# Patient Record
Sex: FEMALE | Race: BLACK OR AFRICAN AMERICAN | NOT HISPANIC OR LATINO | Employment: UNEMPLOYED | RURAL
[De-identification: names, ages, dates, MRNs, and addresses within clinical notes are randomized per-mention and may not be internally consistent; named-entity substitution may affect disease eponyms.]

---

## 2019-09-25 ENCOUNTER — TRANSCRIBE ORDERS (OUTPATIENT)
Dept: PHYSICAL THERAPY | Facility: CLINIC | Age: 44
End: 2019-09-25

## 2019-09-25 DIAGNOSIS — M54.5 LOW BACK PAIN, UNSPECIFIED BACK PAIN LATERALITY, UNSPECIFIED CHRONICITY, WITH SCIATICA PRESENCE UNSPECIFIED: Primary | ICD-10-CM

## 2019-10-23 ENCOUNTER — TREATMENT (OUTPATIENT)
Dept: PHYSICAL THERAPY | Facility: CLINIC | Age: 44
End: 2019-10-23

## 2019-10-23 DIAGNOSIS — M54.41 ACUTE RIGHT-SIDED LOW BACK PAIN WITH RIGHT-SIDED SCIATICA: Primary | ICD-10-CM

## 2019-10-23 PROCEDURE — 97161 PT EVAL LOW COMPLEX 20 MIN: CPT | Performed by: PHYSICAL THERAPIST

## 2019-10-23 NOTE — PROGRESS NOTES
"  Physical Therapy Initial Evaluation and Plan of Care        Patient: Titi Denney   : 1975  Diagnosis/ICD-10 Code:  Acute right-sided low back pain with right-sided sciatica [M54.41]  Referring practitioner: Horacio Cohen MD  Date of Initial Visit: 10/23/2019  Today's Date: 10/23/2019  Patient seen for 1 sessions  PRECAUTIONS  E-stim Implant in right Gluteus         Subjective Questionnaire: Oswestry: 46%      Subjective Evaluation    History of Present Illness  Onset date: 5+ years.  Mechanism of injury: No Known Injury    Subjective comment: Worsening condition.  Started pain management. Does not want to be on pain meds  Patient Occupation: Work at the [employer] Quality of life: good    Pain  Current pain ratin  At best pain ratin  At worst pain rating: 10  Location: Low back pain with pain to the ankle on the right side.  Quality: throbbing, sharp and knife-like  Relieving factors: change in position and rest  Aggravating factors: standing, ambulation and lifting  Progression: worsening    Social Support  Lives in: apartment (upstairs )  Lives with: young children    Hand dominance: right    Diagnostic Tests  X-ray: abnormal    Treatments  Previous treatment: chiropractic  Patient Goals  Patient goals for therapy: decreased pain, increased motion, increased strength and independence with ADLs/IADLs         Objective       Active Range of Motion     Lumbar   Flexion: 60 degrees   Extension: 5 degrees   Left lateral flexion: WFL  Right lateral flexion: Active right lumbar lateral flexion: 6\" from LJL.   TTP cadence PSIS, tenderness around e-stim implant in Right gluteus   Right LLD, inominate rotation. Tight hamstrings.  Negative Piter, Negative SLR  Sensation intact  MMT 5/5 in lower extremities.    Assessment & Plan     Assessment  Impairments: abnormal or restricted ROM, activity intolerance, lacks appropriate home exercise program and pain with function  Assessment details: " Chronic LBP with flexibility and core strength issues. Mechanical SI dysfunction present.   Would benefit from PT for appropriate exercise program and manual therapy.  Prognosis: fair  Functional Limitations: lifting, walking, standing and stooping  Goals  Plan Goals:    1.  Lumbar flexion 12 inches from the floor.  2.  Lumbar extension 10  degrees.  3.  Lumbar side bending 4 inches from the right lateral joint line.  4.  Patient to maintain pelvic alignment with no LLD.  5.  Patient to be minimally reactive to palpation.  6.  Patient independent in HEP and self care.  7.   Pt will have a modified Oswestry score of 38% or less    Plan  Therapy options: will be seen for skilled physical therapy services  Planned therapy interventions: home exercise program, stretching, strengthening, manual therapy, functional ROM exercises and flexibility  Other planned therapy interventions: aquatic  Duration in visits: 20  Treatment plan discussed with: patient  Plan details: Flexibility, core stabilization, manual therapy, Aquatics 1x week, land 1 x week.      Visit Diagnoses:    ICD-10-CM ICD-9-CM   1. Acute right-sided low back pain with right-sided sciatica M54.41 724.2     724.3     Timed:  Manual Therapy:         mins  75437;  Therapeutic Exercise:         mins  02724;     Neuromuscular Ky:        mins  84393;    Therapeutic Activity:          mins  86911;     Gait Training:           mins  23864;     Ultrasound:          mins  24440;    Electrical Stimulation:         mins  76473 ( );    Untimed:  Electrical Stimulation:         mins  51266 ( );  Mechanical Traction:         mins  13465;     Timed Treatment:  0    mins   Total Treatment:      0  mins    PT SIGNATURE: Loretta Torres PT DPT   DATE TREATMENT INITIATED: 10/23/2019    Initial Certification  Certification Period: 1/21/2020  I certify that the therapy services are furnished while this patient is under my care.  The services outlined above  are required by this patient, and will be reviewed every 90 days.     PHYSICIAN: Horacio Cohen MD      DATE:     Please sign and return via fax to [phone].. Thank you, [facility].

## 2019-11-13 ENCOUNTER — TELEPHONE (OUTPATIENT)
Dept: PHYSICAL THERAPY | Facility: CLINIC | Age: 44
End: 2019-11-13

## 2019-11-13 DIAGNOSIS — M54.41 ACUTE RIGHT-SIDED LOW BACK PAIN WITH RIGHT-SIDED SCIATICA: Primary | ICD-10-CM

## 2019-11-13 NOTE — TELEPHONE ENCOUNTER
attempted to call secondary to no show for appointment. unable to make contact. number has been disconnectd or is no longer in service

## 2019-11-15 ENCOUNTER — TREATMENT (OUTPATIENT)
Dept: PHYSICAL THERAPY | Facility: CLINIC | Age: 44
End: 2019-11-15

## 2019-11-15 DIAGNOSIS — M54.41 ACUTE RIGHT-SIDED LOW BACK PAIN WITH RIGHT-SIDED SCIATICA: Primary | ICD-10-CM

## 2019-11-15 PROCEDURE — 97113 AQUATIC THERAPY/EXERCISES: CPT | Performed by: PHYSICAL THERAPIST

## 2019-11-15 NOTE — PROGRESS NOTES
Physical Therapy Daily Progress Note    Patient: Titi Denney   : 1975  Diagnosis/ICD-10 Code:     Diagnosis Plan   1. Acute right-sided low back pain with right-sided sciatica       Referring practitioner: Horacio Cohen MD  Date of Initial Visit: Type: THERAPY  Noted: 10/23/2019  Today's Date: 11/15/2019  Patient seen for 2 sessions      PT Recheck Due: 2019  PT MD Visit: TBD    ESTIM Implant in Right Gluteus       Titi Denney        Subjective Evaluation    History of Present Illness    Subjective comment: pt reports that she is afraid of water.          Objective   See Exercise, Manual, and Modality Logs for complete treatment.       Assessment & Plan     Assessment  Assessment details: Patient able to complete all instructed therex today. Requires cues throughout for appropriate technique.     Goals  Plan Goals: 1.  Lumbar flexion 12 inches from the floor.  2.  Lumbar extension 10  degrees.  3.  Lumbar side bending 4 inches from the right lateral joint line.  4.  Patient to maintain pelvic alignment with no LLD.  5.  Patient to be minimally reactive to palpation.  6.  Patient independent in HEP and self care.  7.   Pt will have a modified Oswestry score of 38% or less    Plan  Plan details: Next visit in [facility]. HS S      Progress strengthening /stabilization /functional activity            Timed:  Manual Therapy:         mins  27100;  Therapeutic Exercise:    50     mins  77353;   Aquatic Therex :        mins  69480    Neuromuscular Ky:        mins  71034;    Therapeutic Activity:          mins  07214;     Gait Training:           mins  99417;     Ultrasound:          mins  06348;    Electrical Stimulation:        mins  97980 ( );    Untimed:  Electrical Stimulation:         mins  27130 ( );  Mechanical Traction:         mins  55534;   Orthotic fit/train:         mins  10389    Timed Treatment:   50   mins   Total Treatment:     50   mins  Radha Fernandez  PTA  Physical Therapist Assistant

## 2019-11-20 ENCOUNTER — TREATMENT (OUTPATIENT)
Dept: PHYSICAL THERAPY | Facility: CLINIC | Age: 44
End: 2019-11-20

## 2019-11-20 DIAGNOSIS — M54.41 ACUTE RIGHT-SIDED LOW BACK PAIN WITH RIGHT-SIDED SCIATICA: Primary | ICD-10-CM

## 2019-11-20 PROCEDURE — 97110 THERAPEUTIC EXERCISES: CPT | Performed by: PHYSICAL THERAPIST

## 2019-11-20 NOTE — PROGRESS NOTES
Re-Assessment / Re-Certification    *    Patient: Titi Denney   : 1975  Diagnosis/ICD-10 Code:  Acute right-sided low back pain with right-sided sciatica [M54.41]  Referring practitioner: Horacio Cohen MD  Date of Initial Visit: Type: THERAPY  Noted: 10/23/2019  Today's Date: 2019  Patient seen for 3 sessions/ 6 scheduled 8 approved exp 19      Subjective:   Titi Denney reports: Pain is tolerable today 5/10, they are taking the implanted stim unit out.  Subjective Questionnaire: Oswestry: 44% improved 10%  Clinical Progress: unchanged  Home Program Compliance: Yes  Treatment has included: therapeutic exercise and manual therapy    Subjective   Objective   20 inches from floor, ext neutral lateral flexion normal with pain, TTP cadence SI joints.  positive piriformis tenderness cadence  Assessment & Plan       Plan  Planned modality interventions: cryotherapy  Planned therapy interventions: manual therapy, abdominal trunk stabilization, strengthening, stretching and home exercise program  Other planned therapy interventions: aquatics  Duration in visits: 10  Treatment plan discussed with: patient  Plan details: Aquatics x 1 per week,  Land core stab and manual therapy 1 x per week. Ice        Visit Diagnoses:    ICD-10-CM ICD-9-CM   1. Acute right-sided low back pain with right-sided sciatica M54.41 724.2     724.3       Progress toward previous goals: Not Met  Goals  Plan Goals:    1.  Lumbar flexion 12 inches from the floor. Not met  2.  Lumbar extension 10  degrees. Not met  3.  Lumbar side bending 4 inches from the right lateral joint line.MET  4.  Patient to maintain pelvic alignment with no LLD. Not met  5.  Patient to be minimally reactive to palpation. Not met  6.  Patient independent in HEP and self care. progressing  7.   Pt will have a modified Oswestry score of 38% or less. progressing        Recommendations: Continue as planned  Timeframe: 3 weeks  Prognosis to achieve goals:  good    PT Signature: Loretta Torres, PT DPT      Based upon review of the patient's progress and continued therapy plan, it is my medical opinion that Titi Denney should continue physical therapy treatment at [facility] THERAPY  [facility]   [place] [address] [phone].    Signature: __________________________________  Horacio Cohen MD    Timed:  Manual Therapy:         mins  76819;  Therapeutic Exercise:   42     mins  68077;     Neuromuscular Ky:        mins  90416;    Therapeutic Activity:          mins  35803;     Gait Training:           mins  21901;     Ultrasound:         mins  16522;    Electrical Stimulation:         mins  52326 ( );    Untimed:  Electrical Stimulation:         mins  09810 ( );  Mechanical Traction:         mins  80878;     Timed Treatment:   42   mins   Total Treatment:    57    mins

## 2019-11-22 ENCOUNTER — TELEPHONE (OUTPATIENT)
Dept: PHYSICAL THERAPY | Facility: CLINIC | Age: 44
End: 2019-11-22

## 2019-11-22 DIAGNOSIS — M54.41 ACUTE RIGHT-SIDED LOW BACK PAIN WITH RIGHT-SIDED SCIATICA: Primary | ICD-10-CM

## 2020-01-16 ENCOUNTER — DOCUMENTATION (OUTPATIENT)
Dept: PHYSICAL THERAPY | Facility: CLINIC | Age: 45
End: 2020-01-16

## 2020-01-16 DIAGNOSIS — M54.41 ACUTE RIGHT-SIDED LOW BACK PAIN WITH RIGHT-SIDED SCIATICA: Primary | ICD-10-CM

## 2020-01-16 NOTE — PROGRESS NOTES
Discharge Summary  Discharge Summary from Physical Therapy Report      Dates  PT visit: 10-  Number of Visits: 3/8     Discharge Status of Patient: pt had poor attendance and did not return    Goals: Not Met    Discharge Plan: pt had poor attendance and did not return    Comments: pt had poor attendance and did not return    Date of Discharge: 01-        Joelle Fernandez, PTA  Physical Therapist Assistant

## 2020-09-28 ENCOUNTER — TRANSCRIBE ORDERS (OUTPATIENT)
Dept: ORTHOPEDIC SURGERY | Facility: CLINIC | Age: 45
End: 2020-09-28

## 2020-09-28 DIAGNOSIS — M25.551 RIGHT HIP PAIN: Primary | ICD-10-CM

## 2020-10-14 ENCOUNTER — OFFICE VISIT (OUTPATIENT)
Dept: OTOLARYNGOLOGY | Facility: CLINIC | Age: 45
End: 2020-10-14

## 2020-10-14 DIAGNOSIS — R49.0 HOARSENESS OF VOICE: Primary | ICD-10-CM

## 2020-10-14 DIAGNOSIS — K21.9 LARYNGOPHARYNGEAL REFLUX (LPR): ICD-10-CM

## 2020-10-14 DIAGNOSIS — R49.0 DYSPHONIA: ICD-10-CM

## 2020-10-14 PROCEDURE — 99203 OFFICE O/P NEW LOW 30 MIN: CPT | Performed by: OTOLARYNGOLOGY

## 2020-10-14 PROCEDURE — 31575 DIAGNOSTIC LARYNGOSCOPY: CPT | Performed by: OTOLARYNGOLOGY

## 2020-10-14 RX ORDER — FLUORIDE TOOTHPASTE
TOOTHPASTE DENTAL
COMMUNITY

## 2020-10-14 RX ORDER — TIZANIDINE 4 MG/1
4 TABLET ORAL 2 TIMES DAILY PRN
COMMUNITY
End: 2020-10-20

## 2020-10-14 RX ORDER — OMEPRAZOLE 40 MG/1
40 CAPSULE, DELAYED RELEASE ORAL DAILY
Qty: 30 CAPSULE | Refills: 2 | Status: SHIPPED | OUTPATIENT
Start: 2020-10-14

## 2020-10-14 RX ORDER — ERGOCALCIFEROL 1.25 MG/1
50000 CAPSULE ORAL WEEKLY
COMMUNITY

## 2020-10-14 RX ORDER — LEVOTHYROXINE SODIUM 300 UG/1
150 TABLET ORAL 2 TIMES DAILY
COMMUNITY

## 2020-10-14 RX ORDER — AMLODIPINE BESYLATE 5 MG/1
5 TABLET ORAL DAILY
COMMUNITY

## 2020-10-14 RX ORDER — SUCRALFATE 1 G/1
1 TABLET ORAL 2 TIMES DAILY
Qty: 60 TABLET | Refills: 2 | Status: SHIPPED | OUTPATIENT
Start: 2020-10-14 | End: 2020-12-09 | Stop reason: SDUPTHER

## 2020-10-14 NOTE — PATIENT INSTRUCTIONS
"BMI for Adults  What is BMI?  Body mass index (BMI) is a number that is calculated from a person's weight and height. BMI can help estimate how much of a person's weight is composed of fat. BMI does not measure body fat directly. Rather, it is an alternative to procedures that directly measure body fat, which can be difficult and expensive.  BMI can help identify people who may be at higher risk for certain medical problems.  What are BMI measurements used for?  BMI is used as a screening tool to identify possible weight problems. It helps determine whether a person is obese, overweight, a healthy weight, or underweight.  BMI is useful for:  · Identifying a weight problem that may be related to a medical condition or may increase the risk for medical problems.  · Promoting changes, such as changes in diet and exercise, to help reach a healthy weight. BMI screening can be repeated to see if these changes are working.  How is BMI calculated?  BMI involves measuring your weight in relation to your height. Both height and weight are measured, and the BMI is calculated from those numbers. This can be done either in English (U.S[place] or metric measurements. Note that charts and online BMI calculators are available to help you find your BMI quickly and easily without having to do these calculations yourself.  To calculate your BMI in English (U.S[place] measurements:    1. Measure your weight in pounds (lb).  2. Multiply the number of pounds by 703.  ? For example, for a person who weighs 180 lb, multiply that number by 703, which equals 126,540.  3. Measure your height in inches. Then multiply that number by itself to get a measurement called \"inches squared.\"  ? For example, for a person who is 70 inches tall, the \"inches squared\" measurement is 70 inches x 70 inches, which equals 4,900 inches squared.  4. Divide the total from step 2 (number of lb x 703) by the total from step 3 (inches squared): 126,540 ÷ 4,900 = 25.8. This is " "your BMI.  To calculate your BMI in metric measurements:  1. Measure your weight in kilograms (kg).  2. Measure your height in meters (m). Then multiply that number by itself to get a measurement called \"meters squared.\"  ? For example, for a person who is 1.75 m tall, the \"meters squared\" measurement is 1.75 m x 1.75 m, which is equal to 3.1 meters squared.  3. Divide the number of kilograms (your weight) by the meters squared number. In this example: 70 ÷ 3.1 = 22.6. This is your BMI.  What do the results mean?  BMI charts are used to identify whether you are underweight, normal weight, overweight, or obese. The following guidelines will be used:  · Underweight: BMI less than 18.5.  · Normal weight: BMI between 18.5 and 24.9.  · Overweight: BMI between 25 and 29.9.  · Obese: BMI of 30 or above.  Keep these notes in mind:  · Weight includes both fat and muscle, so someone with a muscular build, such as an athlete, may have a BMI that is higher than 24.9. In cases like these, BMI is not an accurate measure of body fat.  · To determine if excess body fat is the cause of a BMI of 25 or higher, further assessments may need to be done by a health care provider.  · BMI is usually interpreted in the same way for men and women.  Where to find more information  For more information about BMI, including tools to quickly calculate your BMI, go to these websites:  · Centers for Disease Control and Prevention: www.cdc.gov  · American Heart Association: www.heart.org  · National [facility] [place]: www.nhlbi.nih.gov  Summary  · Body mass index (BMI) is a number that is calculated from a person's weight and height.  · BMI may help estimate how much of a person's weight is composed of fat. BMI can help identify those who may be at higher risk for certain medical problems.  · BMI can be measured using English measurements or metric measurements.  · BMI charts are used to identify whether you are underweight, normal " weight, overweight, or obese.  This information is not intended to replace advice given to you by your health care provider. Make sure you discuss any questions you have with your health care provider.  Document Released: 08/29/2005 Document Revised: 09/09/2020 Document Reviewed: 07/17/2020  Elsevier Patient Education © 2020 [employer]

## 2020-10-14 NOTE — PROGRESS NOTES
Subjective   Titi Denney is a 45 y.o. female.   Hoarse voice  History of Present Illness   Patient she has had a hoarse voice for 5 years since she had a tonsillectomy in [place] but she also admits that she had thyroid surgery had some hoarseness after that prior to that  She had an EGD we do not have the records of that she not on any reflux medicines has a lump sensation some dryness in her throat no mops is no weight loss no adenopathy or mass in the neck is follow-up for thyroid by others for hyperthyroidism    Hoarseness fluctuates not severe currently  The following portions of the patient's history were reviewed and updated as appropriate: allergies, current medications, past family history, past medical history, past social history, past surgical history and problem list.      Titi Denney reports that she has quit smoking. She has never used smokeless tobacco. She reports current alcohol use of about 1.0 standard drinks of alcohol per week. She reports that she does not use drugs.  Patient is not a tobacco user and has been counseled for use of tobacco products    Family History   Problem Relation Age of Onset   • Heart disease Mother    • Hypertension Mother    • Hyperlipidemia Mother    • Rheum arthritis Mother    • Cancer Father    • No Known Problems Sister    • Diabetes Maternal Grandmother    • Hypertension Maternal Grandmother    • Rheum arthritis Maternal Grandmother    • Heart failure Maternal Grandmother    • No Known Problems Sister    • No Known Problems Sister    • Cancer Half-Brother    • No Known Problems Son    • No Known Problems Son    • No Known Problems Daughter          Current Outpatient Medications:   •  ALBUTEROL SULFATE IN, Inhale., Disp: , Rfl:   •  Cyanocobalamin 1000 MCG/ML kit, Inject  as directed 1 (One) Time Per Week., Disp: , Rfl:   •  levothyroxine (SYNTHROID, LEVOTHROID) 300 MCG tablet, Take 150 mcg by mouth 2 (Two) Times a Day., Disp: , Rfl:   •  Mouthwashes  (Biotene/Calcium PBF) liquid, Apply  to the mouth or throat., Disp: , Rfl:   •  tiZANidine (ZANAFLEX) 4 MG tablet, Take 4 mg by mouth 2 (Two) Times a Day As Needed for Muscle Spasms., Disp: , Rfl:   •  triamcinolone (KENALOG) 0.1 % ointment, Apply  topically to the appropriate area as directed 2 (Two) Times a Day As Needed., Disp: , Rfl:   •  vitamin D (ERGOCALCIFEROL) 1.25 MG (91285 UT) capsule capsule, Take 50,000 Units by mouth 1 (One) Time Per Week., Disp: , Rfl:   •  amLODIPine (NORVASC) 5 MG tablet, Take 5 mg by mouth Daily., Disp: , Rfl:   •  omeprazole (priLOSEC) 40 MG capsule, Take 1 capsule by mouth Daily. Take in am on empty stomach, Disp: 30 capsule, Rfl: 2  •  sucralfate (CARAFATE) 1 g tablet, Take 1 tablet by mouth 2 (two) times a day., Disp: 60 tablet, Rfl: 2    No Known Allergies    Past Medical History:   Diagnosis Date   • Anemia    • Arthritis    • Bronchitis    • Depression    • Disease of thyroid gland    • Headache    • Hoarseness    • Obesity    • Skin rash    • Sore throat    • Vitamin B 12 deficiency    • Vitamin D deficiency        Past Surgical History:   Procedure Laterality Date   •  SECTION WITH TUBAL      c/s x 3   • DENTAL PROCEDURE      full mouth extraction   • THYROID SURGERY     • TONSILLECTOMY     • VAGINAL HYSTERECTOMY SALPINGO OOPHORECTOMY      took left ovary only       Review of Systems   HENT: Positive for sore throat and voice change.    Respiratory: Positive for choking.    Musculoskeletal: Positive for neck stiffness.   All other systems reviewed and are negative.          Objective   Physical Exam  HENT:      Head: Normocephalic.      Right Ear: Tympanic membrane, ear canal and external ear normal.      Left Ear: Tympanic membrane, ear canal and external ear normal.      Nose:      Comments: Deviated septum no drainage pus or polyps     Mouth/Throat:      Pharynx: Oropharynx is clear.   Eyes:      Conjunctiva/sclera: Conjunctivae normal.   Neck:       Musculoskeletal: Normal range of motion.     Pulmonary:      Effort: Pulmonary effort is normal.   Neurological:      General: No focal deficit present.      Mental Status: She is alert.   Psychiatric:         Mood and Affect: Mood normal.         Behavior: Behavior normal.             Procedure Note    Pre-operative Diagnosis:   Chief Complaint   Patient presents with   • Dysphonia     ref: Gwendolyn       Post-operative Diagnosis: same    Anesthesia: topical with xylocaine and neosynephrine    Endoscopy Type:  Flexible Laryngoscopy    Procedure Details:    The patient was placed in the sitting position.  After topical anesthesia and decongestion, the 4 mm laryngoscope was passed.  The nasal cavities, nasopharynx, oropharynx, hypopharynx, and larynx were all examined.  Vocal cords were examined during respiration and phonation.  The following findings were noted:    Findings: Previously noted nasal findings were confirmed. Nasopharynx without mass, hypopharynx and larynx without evidence of neoplasm. Vocal cord mobility intact. There is chronic appearing edema and erythema of the laryngeal structures consistent with chronic laryngitis.  No obvious paralysis though would be difficult evaluate for the external branch which may have caused some effect on her hoarseness  Condition:  Stable.  Patient tolerated procedure well.    Complications:  None  Assessment/Plan   Diagnoses and all orders for this visit:    1. Hoarseness of voice (Primary)  -     Ambulatory Referral to Speech Therapy    2. Dysphonia  -     Ambulatory Referral to Speech Therapy    3. Laryngopharyngeal reflux (LPR)    Other orders  -     sucralfate (CARAFATE) 1 g tablet; Take 1 tablet by mouth 2 (two) times a day.  Dispense: 60 tablet; Refill: 2  -     omeprazole (priLOSEC) 40 MG capsule; Take 1 capsule by mouth Daily. Take in am on empty stomach  Dispense: 30 capsule; Refill: 2    Dysphonia possibly from external branch   Recommend voice therapy  recommend treat reflux discussed how to use the medication discussed reflux  Therapy follow-up 8 weeks when she has had therapy  And the medications  Call any questions or problems

## 2020-10-20 ENCOUNTER — TRANSCRIBE ORDERS (OUTPATIENT)
Dept: PHYSICAL THERAPY | Facility: HOSPITAL | Age: 45
End: 2020-10-20

## 2020-10-20 ENCOUNTER — OFFICE VISIT (OUTPATIENT)
Dept: ORTHOPEDIC SURGERY | Facility: CLINIC | Age: 45
End: 2020-10-20

## 2020-10-20 VITALS
OXYGEN SATURATION: 98 % | HEART RATE: 88 BPM | HEIGHT: 67 IN | TEMPERATURE: 98 F | WEIGHT: 283 LBS | BODY MASS INDEX: 44.42 KG/M2 | SYSTOLIC BLOOD PRESSURE: 140 MMHG | DIASTOLIC BLOOD PRESSURE: 82 MMHG

## 2020-10-20 DIAGNOSIS — M70.61 GREATER TROCHANTERIC BURSITIS OF RIGHT HIP: ICD-10-CM

## 2020-10-20 DIAGNOSIS — M70.61 TROCHANTERIC BURSITIS OF RIGHT HIP: ICD-10-CM

## 2020-10-20 DIAGNOSIS — M25.551 RIGHT HIP PAIN: Primary | ICD-10-CM

## 2020-10-20 DIAGNOSIS — E66.01 MORBID (SEVERE) OBESITY DUE TO EXCESS CALORIES (HCC): ICD-10-CM

## 2020-10-20 DIAGNOSIS — N94.2 VAGINISMUS: Primary | ICD-10-CM

## 2020-10-20 PROCEDURE — 99203 OFFICE O/P NEW LOW 30 MIN: CPT | Performed by: ORTHOPAEDIC SURGERY

## 2020-10-20 PROCEDURE — 20610 DRAIN/INJ JOINT/BURSA W/O US: CPT | Performed by: ORTHOPAEDIC SURGERY

## 2020-10-20 RX ORDER — TRIAMCINOLONE ACETONIDE 40 MG/ML
80 INJECTION, SUSPENSION INTRA-ARTICULAR; INTRAMUSCULAR
Status: COMPLETED | OUTPATIENT
Start: 2020-10-20 | End: 2020-10-20

## 2020-10-20 RX ORDER — BUPIVACAINE HYDROCHLORIDE 5 MG/ML
3 INJECTION, SOLUTION PERINEURAL
Status: COMPLETED | OUTPATIENT
Start: 2020-10-20 | End: 2020-10-20

## 2020-10-20 RX ORDER — LIDOCAINE HYDROCHLORIDE AND EPINEPHRINE 10; 10 MG/ML; UG/ML
2 INJECTION, SOLUTION INFILTRATION; PERINEURAL
Status: COMPLETED | OUTPATIENT
Start: 2020-10-20 | End: 2020-10-20

## 2020-10-20 RX ADMIN — TRIAMCINOLONE ACETONIDE 80 MG: 40 INJECTION, SUSPENSION INTRA-ARTICULAR; INTRAMUSCULAR at 10:33

## 2020-10-20 RX ADMIN — LIDOCAINE HYDROCHLORIDE AND EPINEPHRINE 2 ML: 10; 10 INJECTION, SOLUTION INFILTRATION; PERINEURAL at 10:33

## 2020-10-20 RX ADMIN — BUPIVACAINE HYDROCHLORIDE 3 ML: 5 INJECTION, SOLUTION PERINEURAL at 10:33

## 2020-10-20 NOTE — PROGRESS NOTES
Titi Denney is a 45 y.o. female   Primary provider:  Anthony Snow MD       Chief Complaint   Patient presents with   • Right Hip - Pain       HISTORY OF PRESENT ILLNESS: Is the first office visit for evaluation of right-sided hip and pelvic pain.    Ms. Denney is 45 years old.  She is considered to be a fair historian.  She said she has had several months of pain in the right hip and thigh.  There is been no trauma and she denies any previous problems with the hip.  Pain radiates from the groin to the anterolateral aspect of the hip and into the lateral hip and occasionally into the thigh.  The pain is worse with sitting and lying on the right side as well as with walking.  There is been no specific relieving factor.  Previous treatment has included analgesics.  She said she was seen by Dr. Zabala in [place] and apparently physical therapy was recommended but this was never arranged.  She denies any numbness or tingling in the lower extremities.    Home medications include Norvasc albuterol Synthroid Prilosec and vitamin D.  She has no drug allergies.  He does not smoke but vapes an uncertain amount.  Past medical history is remarkable for hypothyroidism anemia depression and bronchitis.  She is single and employed at the [employer] in [place].    Dr. Snow has asked that I see her for evaluation and treatment.    Pain  This is a chronic problem. The current episode started more than 1 year ago. The problem occurs intermittently. Associated symptoms include chills, a fever and headaches. Associated symptoms comments: Stabbing, aching, burning, clicking, popping, swelling. The symptoms are aggravated by walking and standing (sitting). She has tried rest for the symptoms. The treatment provided mild relief.   Xray at [facility]  7/31/19.  Bilateral hips.       CONCURRENT MEDICAL HISTORY:    Past Medical History:   Diagnosis Date   • Anemia    • Arthritis    • Bronchitis    • Depression     • Disease of thyroid gland    • Headache    • Hoarseness    • Obesity    • Skin rash    • Sore throat    • Vitamin B 12 deficiency    • Vitamin D deficiency        No Known Allergies      Current Outpatient Medications:   •  ALBUTEROL SULFATE IN, Inhale., Disp: , Rfl:   •  amLODIPine (NORVASC) 5 MG tablet, Take 5 mg by mouth Daily., Disp: , Rfl:   •  Cyanocobalamin 1000 MCG/ML kit, Inject  as directed 1 (One) Time Per Week., Disp: , Rfl:   •  levothyroxine (SYNTHROID, LEVOTHROID) 300 MCG tablet, Take 150 mcg by mouth 2 (Two) Times a Day., Disp: , Rfl:   •  Mouthwashes (Biotene/Calcium PBF) liquid, Apply  to the mouth or throat., Disp: , Rfl:   •  omeprazole (priLOSEC) 40 MG capsule, Take 1 capsule by mouth Daily. Take in am on empty stomach, Disp: 30 capsule, Rfl: 2  •  sucralfate (CARAFATE) 1 g tablet, Take 1 tablet by mouth 2 (two) times a day., Disp: 60 tablet, Rfl: 2  •  triamcinolone (KENALOG) 0.1 % ointment, Apply  topically to the appropriate area as directed 2 (Two) Times a Day As Needed., Disp: , Rfl:   •  vitamin D (ERGOCALCIFEROL) 1.25 MG (48580 UT) capsule capsule, Take 50,000 Units by mouth 1 (One) Time Per Week., Disp: , Rfl:     Past Surgical History:   Procedure Laterality Date   •  SECTION WITH TUBAL      c/s x 3   • DENTAL PROCEDURE      full mouth extraction   • THYROID SURGERY     • TONSILLECTOMY     • VAGINAL HYSTERECTOMY SALPINGO OOPHORECTOMY      took left ovary only       Family History   Problem Relation Age of Onset   • Heart disease Mother    • Hypertension Mother    • Hyperlipidemia Mother    • Rheum arthritis Mother    • Cancer Father    • No Known Problems Sister    • Diabetes Maternal Grandmother    • Hypertension Maternal Grandmother    • Rheum arthritis Maternal Grandmother    • Heart failure Maternal Grandmother    • No Known Problems Sister    • No Known Problems Sister    • Cancer Half-Brother    • No Known Problems Son    • No Known Problems Son    • No Known Problems  "Daughter        Social History     Socioeconomic History   • Marital status: Single     Spouse name: Not on file   • Number of children: Not on file   • Years of education: Not on file   • Highest education level: Not on file   Tobacco Use   • Smoking status: Former Smoker   • Smokeless tobacco: Never Used   Substance and Sexual Activity   • Alcohol use: Yes     Alcohol/week: 1.0 standard drinks     Types: 1 Glasses of wine per week     Frequency: 4 or more times a week     Binge frequency: Never   • Drug use: No   • Sexual activity: Defer        Review of Systems   Constitutional: Positive for chills, fever and unexpected weight change.   Eyes: Negative.    Respiratory: Negative.    Cardiovascular: Negative.    Gastrointestinal: Negative.    Endocrine: Negative.    Genitourinary: Negative.    Musculoskeletal: Negative.    Skin: Negative.    Allergic/Immunologic: Negative.    Neurological: Positive for headaches.   Hematological: Negative.    Psychiatric/Behavioral: Negative.    Review of systems is positive as noted above.    PHYSICAL EXAMINATION:       Vitals:    10/20/20 0957   BP: 140/82   BP Location: Left arm   Patient Position: Sitting   Pulse: 88   Temp: 98 °F (36.7 °C)   TempSrc: Temporal   SpO2: 98%   Weight: 128 kg (283 lb)   Height: 170.2 cm (67\")   PainSc:   7   Body mass index is 44.32 kg/m².      Physical Exam in general she is alert and in no apparent distress at rest.  She is frequently tearful during the exam.  She responds appropriately to questions and commands.    GAIT:     []  Normal  [x]  Antalgic    Assistive device: [x]  None  []  Walker     []  Crutches  []  Cane     []  Wheelchair  []  Stretcher    Ortho Exam he walks with a moderately antalgic gait favoring the right.  She complains of moderate pain with heel and toe walk bilaterally but there was no apparent weakness in the lower extremities with this maneuver.  Trunk flexion will bring her fingertips to mid thigh with complaints of " severe right hip pain.  There is tenderness diffusely in the low back and buttocks bilaterally with a markedly positive flinch sign.  Her tenderness appears to be most prominent over the right greater trochanter.  Reflexes are absent symmetrically at the ankles and knees.  Sensory exam reveals hypnesthesia to soft touch over the medial lateral aspects of the right thigh.  Dorsalis pedis pulse is strong.  Sciatic tension signs are absent.  Leg lengths are equal.  Right hip motion is smooth but markedly decreased with complaints of pain.    Radiographs of the hip done several weeks ago show a suggestion of mild medial osteoarthritis of the right hip.  There is a mild cam deformity of the right femoral neck.  There is also irregularity of the body of the right pubis with spurring which appears to be chronic.          No results found.        ASSESSMENT: 1 right hip pain probably secondary to trochanteric bursitis.  2 morbid obesity.    The natural history of the disorder and treatment options were reviewed.  She was reassured I see no surgical indications.  He was instructed in stretching exercises for the iliotibial band.  Potential benefits of injection therapy were discussed.  She wished to proceed with this.    The right hip was prepped.  Skin was infiltrated with 1% Xylocaine with epinephrine.  The trochanteric bursa was then injected with a mixture of Marcaine Kenalog and Xylocaine with epinephrine.  There were no complications.  On standing after the injection she said her hip was numb and she denied any pain.        She may advance activities as tolerated.  Return here as needed.    Diagnoses and all orders for this visit:    Right hip pain  -     Large Joint Arthrocentesis: R greater trochanteric bursa    Greater trochanteric bursitis of right hip  -     Large Joint Arthrocentesis: R greater trochanteric bursa    Trochanteric bursitis of right hip    Morbid (severe) obesity due to excess calories  (CMS/[facility])      Large Joint Arthrocentesis: R greater trochanteric bursa  Date/Time: 10/20/2020 10:33 AM  Consent given by: patient  Site marked: site marked  Timeout: Immediately prior to procedure a time out was called to verify the correct patient, procedure, equipment, support staff and site/side marked as required   Supporting Documentation  Indications: pain   Procedure Details  Location: hip - R greater trochanteric bursa  Preparation: Patient was prepped and draped in the usual sterile fashion  Needle size: 22 G  Approach: lateral  Medications administered: 2 mL lidocaine-EPINEPHrine 1 %-1:[id number]; 3 mL bupivacaine 0.5 %; 80 mg triamcinolone acetonide 40 MG/ML  Patient tolerance: patient tolerated the procedure well with no immediate complications          PLAN    Patient's Body mass index is 44.32 kg/m². BMI is above normal parameters. Recommendations include: exercise counseling, nutrition counseling and referral to primary care.    Return if symptoms worsen or fail to improve.    Wu White MD

## 2020-10-22 ENCOUNTER — TELEPHONE (OUTPATIENT)
Dept: ORTHOPEDIC SURGERY | Facility: CLINIC | Age: 45
End: 2020-10-22

## 2020-10-22 DIAGNOSIS — M70.61 TROCHANTERIC BURSITIS OF RIGHT HIP: Primary | ICD-10-CM

## 2020-10-22 RX ORDER — CELECOXIB 200 MG/1
200 CAPSULE ORAL DAILY
Qty: 20 CAPSULE | Refills: 1 | Status: SHIPPED | OUTPATIENT
Start: 2020-10-22

## 2020-10-22 NOTE — TELEPHONE ENCOUNTER
DR VAUGHN.  PATIENT WAS IN THE OFFICETUESDAY, 10/20/2020.  SHE IS TRYING TO WORK AND SHE IS REQUESTING SOMETHING FOR PAIN AT [facility], [facility].    Prescription for Celebrex called to her pharmacy

## 2020-11-18 ENCOUNTER — HOSPITAL ENCOUNTER (OUTPATIENT)
Dept: SPEECH THERAPY | Facility: HOSPITAL | Age: 45
Setting detail: THERAPIES SERIES
Discharge: HOME | End: 2020-11-18

## 2020-11-18 DIAGNOSIS — R49.0 DYSPHONIA: ICD-10-CM

## 2020-11-18 DIAGNOSIS — R49.0 HOARSENESS OF VOICE: ICD-10-CM

## 2020-11-18 PROCEDURE — 92524 BEHAVRAL QUALIT ANALYS VOICE: CPT | Performed by: SPEECH-LANGUAGE PATHOLOGIST

## 2020-11-18 NOTE — THERAPY EVALUATION
Outpatient Speech Language Pathology   Adult Speech Language Cognitive Initial Evaluation  [facility]     Patient Name: Titi Denney  : 1975  MRN: [id number]  Today's Date: 2020        Visit Date: 2020   There is no problem list on file for this patient.       Past Medical History:   Diagnosis Date   • Anemia    • Arthritis    • Bronchitis    • Depression    • Disease of thyroid gland    • Headache    • Hoarseness    • Obesity    • Skin rash    • Sore throat    • Vitamin B 12 deficiency    • Vitamin D deficiency         Past Surgical History:   Procedure Laterality Date   •  SECTION WITH TUBAL      c/s x 3   • DENTAL PROCEDURE      full mouth extraction   • THYROID SURGERY     • TONSILLECTOMY     • VAGINAL HYSTERECTOMY SALPINGO OOPHORECTOMY      took left ovary only         Visit Dx:    ICD-10-CM ICD-9-CM   1. Hoarseness of voice  R49.0 784.42   2. Dysphonia  R49.0 784.42           SLP SLC Evaluation - 20 1027        Communication Assessment/Intervention    Document Type  evaluation   -EK    Subjective Information  complains of    change in voice and difficulty swallowing  -EK    Care Plan Review  evaluation/treatment results reviewed   -EK       General Information    Patient Profile Reviewed  yes   -EK    Pertinent History Of Current Problem  Pt reports concern for hoarseness but reports dysphagia as well. Pt reports required heimlich by her family two days ago. Since surgery she reports trouble with meats (no longer eats meats and breads). Tonsillectomy 5 years ago and trouble with voice since. Pt has had thyroid surgery as well.    -EK    Precautions/Limitations, Vision  WFL   -EK    Precautions/Limitations, Hearing  WFL   -EK    Prior Level of Function-Communication  WFL   -EK    Plans/Goals Discussed with  patient   -EK       Pain    Additional Documentation  Pain Scale: Numbers Pre/Post-Treatment (Group)   -EK       Pain Scale: Numbers Pre/Post-Treatment     Pretreatment Pain Rating  0/10 - no pain   -EK    Posttreatment Pain Rating  0/10 - no pain   -EK      User Key  (r) = Recorded By, (t) = Taken By, (c) = Cosigned By    Initials Name Provider Type    Shelby Leo CCC-SLP Speech and Language Pathologist        Goals:   1. Pt to complete laryngeal strengthening exercises with 90% accuracy.  2. Pt to be independent with reflux management and precautions home education program.  3. Pt to be independent with vocal hygiene program.  4. SLP to complete bedside swallow evaluation at next visit to assess s/s of aspiration for diet safety.                 OP SLP Education     Row Name 11/18/20 1027       Education    Barriers to Learning  No barriers identified  -EK    Assessed  Learning motivation  -EK    Learning Motivation  Strong  -EK    Learning Method  Explanation;Demonstration  -EK    Teaching Response  Verbalized understanding;Demonstrated understanding  -EK    Education Comments  Will evaluate dysphagia, provide laryngeal exercises, and reflux management and strategies.   -EK      User Key  (r) = Recorded By, (t) = Taken By, (c) = Cosigned By    Initials Name Effective Dates    Shelby Leo CCC-SLP 07/24/19 -           SLP OP Goals     Row Name 11/18/20 1100          SLP Time Calculation    SLP Goal Re-Cert Due Date  12/09/20  -EK       User Key  (r) = Recorded By, (t) = Taken By, (c) = Cosigned By    Initials Name Provider Type    Shelby Leo CCC-SLP Speech and Language Pathologist          OP SLP Assessment/Plan - 11/18/20 1027        SLP Assessment    Functional Problems  Voice   -EK    Impact on Function- Voice  Restrictions in personal and social life   -EK    Clinical Impression- Voice  Mild voice disorder   -EK    Functional Problems Comment  Pt displays a voice handicap of a moderate degree. Pt's voice is decreased and SLP had three episodes of requiring repetion from pt. Pt with poor sustained  phonation of /a/ and /i/. Pt begins to cough and throat clear.    -EK    Clinical Impression Comments  Pt with concern for voicing however significant concern for dysphagia. Pt reports does not eat meat or breads. Eats smoothies and purees. Pt reports throat irriation and itchiness throughout session. She reports difficulty with reflux and feels like it is uncontrolled.    -EK    SLP Diagnosis  dysphonia   -EK    Prognosis  Good (comment)   -EK       SLP Plan    Frequency  one time per week for 4/5 sessions   -EK    Plan Comments  SLP to assess swallow on December 2, 2020. Pt will be given vocal hygiene, laryngeal exercises, and reflux precautions.     -EK      User Key  (r) = Recorded By, (t) = Taken By, (c) = Cosigned By    Initials Name Provider Type    Shelby Leo CCC-SLP Speech and Language Pathologist             SLP Outcome Measures (last 72 hours)      SLP Outcome Measures     Row Name 11/18/20 1500             SLP Outcome Measures    Outcome Measure Used?  Adult NOMS  -EK      Date of Onset/Exacerbation of Primary Medical Diagnosis  More than 12 months  -EK      SLP Diagnoses  Voice Disorder  -EK      Current Treatment Setting  Outpatient Rehab  -EK      Did the Patient Receive SLP Services in the Previous Setting?  No  -EK      Patient Setting Subsequent to Discharge  Home  -EK      Average Number of Sessions per Week  Five  -EK      Length of Typical SLP Treatment Session  31 to 45 minutes  -EK      Is English the Primary Language of this Patient?  Yes  -EK         Adult FCM Scores    FCM Chosen  Voice  -EK      Voice FCM Score  5  -EK      Voice FCM - Treatment Time  28  -EK      Voice Primary Service Delivery Model FCM  Individual  -EK        User Key  (r) = Recorded By, (t) = Taken By, (c) = Cosigned By    Initials Name Effective Dates    Shelby Leo CCC-SLP 07/24/19 -              Time Calculation:   SLP Start Time: 1027  SLP Stop Time: 1055  SLP Time  Calculation (min): 28 min    Therapy Charges for Today     Code Description Service Date Service Provider Modifiers Qty    [id number]  [facility] 2 11/18/2020 Shelby Lao, CCC-SLP GN 1                   Shelby Lao CCC-SLP  11/18/2020

## 2020-12-02 ENCOUNTER — APPOINTMENT (OUTPATIENT)
Dept: SPEECH THERAPY | Facility: HOSPITAL | Age: 45
End: 2020-12-02

## 2020-12-09 ENCOUNTER — OFFICE VISIT (OUTPATIENT)
Dept: OTOLARYNGOLOGY | Facility: CLINIC | Age: 45
End: 2020-12-09

## 2020-12-09 VITALS — TEMPERATURE: 96.8 F | HEIGHT: 67 IN | BODY MASS INDEX: 45.04 KG/M2 | WEIGHT: 287 LBS

## 2020-12-09 DIAGNOSIS — J30.1 NON-SEASONAL ALLERGIC RHINITIS DUE TO POLLEN: ICD-10-CM

## 2020-12-09 DIAGNOSIS — K21.9 LARYNGOPHARYNGEAL REFLUX (LPR): ICD-10-CM

## 2020-12-09 DIAGNOSIS — R49.0 DYSPHONIA: ICD-10-CM

## 2020-12-09 DIAGNOSIS — R49.0 HOARSENESS OF VOICE: Primary | ICD-10-CM

## 2020-12-09 PROCEDURE — 99213 OFFICE O/P EST LOW 20 MIN: CPT | Performed by: OTOLARYNGOLOGY

## 2020-12-09 RX ORDER — SUCRALFATE 1 G/1
1 TABLET ORAL 2 TIMES DAILY
Qty: 60 TABLET | Refills: 2 | Status: SHIPPED | OUTPATIENT
Start: 2020-12-09

## 2020-12-09 RX ORDER — PNV NO.95/FERROUS FUM/FOLIC AC 28MG-0.8MG
TABLET ORAL
COMMUNITY
Start: 2020-09-30

## 2020-12-09 RX ORDER — NAPROXEN 500 MG/1
TABLET ORAL
COMMUNITY
Start: 2020-09-21 | End: 2020-12-09

## 2020-12-09 RX ORDER — AZELASTINE 1 MG/ML
2 SPRAY, METERED NASAL 2 TIMES DAILY
Qty: 30 ML | Refills: 5 | Status: SHIPPED | OUTPATIENT
Start: 2020-12-09

## 2020-12-09 RX ORDER — CETIRIZINE HYDROCHLORIDE 10 MG/1
10 TABLET ORAL DAILY PRN
Qty: 30 TABLET | Refills: 11 | COMMUNITY

## 2020-12-09 NOTE — PROGRESS NOTES
Kelsey Denney is a 45 y.o. female.   Voice    History of Present Illness   Patient continues to intermittently in terms of where she seen the speech therapist but missed 1 point because the therapist was available she still having some reflux symptoms    She seen a GI specialist and I told her is nothing I could see.  She continues to have burning heart-occasional swallowing problems she is had thyroid surgery which probably contributes to her voice problem      The following portions of the patient's history were reviewed and updated as appropriate: allergies, current medications, past family history, past medical history, past social history, past surgical history and problem list.      Current Outpatient Medications:   •  ALBUTEROL SULFATE IN, Inhale., Disp: , Rfl:   •  amLODIPine (NORVASC) 5 MG tablet, Take 5 mg by mouth Daily., Disp: , Rfl:   •  celecoxib (CeleBREX) 200 MG capsule, Take 1 capsule by mouth Daily., Disp: 20 capsule, Rfl: 1  •  Cyanocobalamin 1000 MCG/ML kit, Inject  as directed 1 (One) Time Per Week., Disp: , Rfl:   •  ferrous sulfate 325 (65 Fe) MG tablet, , Disp: , Rfl:   •  levothyroxine (SYNTHROID, LEVOTHROID) 300 MCG tablet, Take 150 mcg by mouth 2 (Two) Times a Day., Disp: , Rfl:   •  Mouthwashes (Biotene/Calcium PBF) liquid, Apply  to the mouth or throat., Disp: , Rfl:   •  omeprazole (priLOSEC) 40 MG capsule, Take 1 capsule by mouth Daily. Take in am on empty stomach, Disp: 30 capsule, Rfl: 2  •  sucralfate (CARAFATE) 1 g tablet, Take 1 tablet by mouth 2 (two) times a day., Disp: 60 tablet, Rfl: 2  •  triamcinolone (KENALOG) 0.1 % ointment, Apply  topically to the appropriate area as directed 2 (Two) Times a Day As Needed., Disp: , Rfl:   •  vitamin D (ERGOCALCIFEROL) 1.25 MG (56025 UT) capsule capsule, Take 50,000 Units by mouth 1 (One) Time Per Week., Disp: , Rfl:   •  azelastine (ASTELIN) 0.1 % nasal spray, 2 sprays into the nostril(s) as directed by provider 2 (Two)  Times a Day. Use in each nostril as directed, Disp: 30 mL, Rfl: 5  •  cetirizine (zyrTEC) 10 MG tablet, Take 1 tablet by mouth Daily As Needed for Allergies., Disp: 30 tablet, Rfl: 11    No Known Allergies          Review of Systems   Constitutional: Negative for fever.   HENT: Positive for trouble swallowing and voice change.    Cardiovascular: Negative for chest pain.   Allergic/Immunologic: Positive for environmental allergies.   Hematological: Negative for adenopathy.           Objective   Physical Exam  Vitals signs and nursing note reviewed.   HENT:      Head: Normocephalic.      Right Ear: Tympanic membrane and ear canal normal.      Left Ear: Tympanic membrane and ear canal normal.      Nose: Nose normal.      Mouth/Throat:      Pharynx: Oropharynx is clear.   Eyes:      Conjunctiva/sclera: Conjunctivae normal.   Neck:      Musculoskeletal: No muscular tenderness.   Lymphadenopathy:      Cervical: No cervical adenopathy.   Skin:     General: Skin is warm.   Neurological:      General: No focal deficit present.      Mental Status: She is alert.   Psychiatric:         Mood and Affect: Mood normal.     IDL shows no change in terms of lesion or mass or some mild reflux findings        Assessment/Plan   Diagnoses and all orders for this visit:    1. Hoarseness of voice (Primary)    2. Dysphonia    3. Laryngopharyngeal reflux (LPR)    4. Non-seasonal allergic rhinitis due to pollen    Other orders  -     sucralfate (CARAFATE) 1 g tablet; Take 1 tablet by mouth 2 (two) times a day.  Dispense: 60 tablet; Refill: 2  -     azelastine (ASTELIN) 0.1 % nasal spray; 2 sprays into the nostril(s) as directed by provider 2 (Two) Times a Day. Use in each nostril as directed  Dispense: 30 mL; Refill: 5    inCreased reflux dose  Given prescription for Zyrtec  Continue speech therapy follow-up in 6 weeks unless problems or question

## 2020-12-09 NOTE — PATIENT INSTRUCTIONS
MyPlate from USDA    MyPlate is an outline of a general healthy diet based on the 2010 Dietary Guidelines for Americans, from the U.S. Department of Agriculture (USDA). It sets guidelines for how much food you should eat from each food group based on your age, sex, and level of physical activity.  What are tips for following MyPlate?  To follow MyPlate recommendations:  · Eat a wide variety of fruits and vegetables, grains, and protein foods.  · Serve smaller portions and eat less food throughout the day.  · Limit portion sizes to avoid overeating.  · Enjoy your food.  · Get at least 150 minutes of exercise every week. This is about 30 minutes each day, 5 or more days per week.  It can be difficult to have every meal look like MyPlate. Think about MyPlate as eating guidelines for an entire day, rather than each individual meal.  Fruits and vegetables  · Make half of your plate fruits and vegetables.  · Eat many different colors of fruits and vegetables each day.  · For a 2,000 calorie daily food plan, eat:  ? 2½ cups of vegetables every day.  ? 2 cups of fruit every day.  · 1 cup is equal to:  ? 1 cup raw or cooked vegetables.  ? 1 cup raw fruit.  ? 1 medium-sized orange, apple, or banana.  ? 1 cup 100% fruit or vegetable juice.  ? 2 cups raw leafy greens, such as lettuce, spinach, or kale.  ? ½ cup dried fruit.  Grains  · One fourth of your plate should be grains.  · Make at least half of the grains you eat each day whole grains.  · For a 2,000 calorie daily food plan, eat 6 oz of grains every day.  · 1 oz is equal to:  ? 1 slice bread.  ? 1 cup cereal.  ? ½ cup cooked rice, cereal, or pasta.  Protein  · One fourth of your plate should be protein.  · Eat a wide variety of protein foods, including meat, poultry, fish, eggs, beans, nuts, and tofu.  · For a 2,000 calorie daily food plan, eat 5½ oz of protein every day.  · 1 oz is equal to:  ? 1 oz meat, poultry, or fish.  ? ¼ cup cooked beans.  ? 1 egg.  ? ½ oz nuts  or seeds.  ? 1 Tbsp peanut butter.  Dairy  · Drink fat-free or low-fat (1%) milk.  · Eat or drink dairy as a side to meals.  · For a 2,000 calorie daily food plan, eat or drink 3 cups of dairy every day.  · 1 cup is equal to:  ? 1 cup milk, yogurt, cottage cheese, or soy milk (soy beverage).  ? 2 oz processed cheese.  ? 1½ oz natural cheese.  Fats, oils, salt, and sugars  · Only small amounts of oils are recommended.  · Avoid foods that are high in calories and low in nutritional value (empty calories), like foods high in fat or added sugars.  · Choose foods that are low in salt (sodium). Choose foods that have less than 140 milligrams (mg) of sodium per serving.  · Drink water instead of sugary drinks. Drink enough water each day to keep your urine pale yellow.  Where to find support  · Work with your health care provider or a nutrition specialist (dietitian) to develop a customized eating plan that is right for you.  · Download an cris (mobile application) to help you track your daily food intake.  Where to find more information  · Go to ChooseMyPlate.gov for more information.  Summary  · MyPlate is a general guideline for healthy eating from the USDA. It is based on the 2010 Dietary Guidelines for Americans.  · In general, fruits and vegetables should take up ½ of your plate, grains should take up ¼ of your plate, and protein should take up ¼ of your plate.  This information is not intended to replace advice given to you by your health care provider. Make sure you discuss any questions you have with your health care provider.  Document Revised: 05/21/2020 Document Reviewed: 03/19/2018  Elsevier Patient Education © 2020 [employer] Inc.

## 2020-12-10 ENCOUNTER — TELEPHONE (OUTPATIENT)
Dept: ORTHOPEDIC SURGERY | Facility: CLINIC | Age: 45
End: 2020-12-10

## 2020-12-10 NOTE — TELEPHONE ENCOUNTER
JOHANA        Pt HAS STARTED A NEW JOB AND DURING HER PHYSICAL EXAM SHE INFORMED THEM OF HER HIP PAIN     THEY ARE NEEDING A NOTE STATING SHE IS OKAY TO WORK A 12 HOUR SHIFT WITHOUT RESTRICTIONS..    CAN WE DO THIS?    Pt CALL BACK # [phone]    She may work with no restrictions.    She called requesting pain medication 2 days after her last visit here.  Since then she has failed to keep 2 or more follow-up appointments.  If she needs anything for pain she should use over-the-counter analgesics.

## 2020-12-30 ENCOUNTER — APPOINTMENT (OUTPATIENT)
Dept: SPEECH THERAPY | Facility: HOSPITAL | Age: 45
End: 2020-12-30

## 2021-01-04 ENCOUNTER — APPOINTMENT (OUTPATIENT)
Dept: SPEECH THERAPY | Facility: HOSPITAL | Age: 46
End: 2021-01-04